# Patient Record
Sex: MALE | Race: WHITE | NOT HISPANIC OR LATINO | Employment: FULL TIME | RURAL
[De-identification: names, ages, dates, MRNs, and addresses within clinical notes are randomized per-mention and may not be internally consistent; named-entity substitution may affect disease eponyms.]

---

## 2022-09-19 ENCOUNTER — OFFICE VISIT (OUTPATIENT)
Dept: FAMILY MEDICINE CLINIC | Facility: CLINIC | Age: 27
End: 2022-09-19

## 2022-09-19 VITALS
OXYGEN SATURATION: 97 % | RESPIRATION RATE: 12 BRPM | SYSTOLIC BLOOD PRESSURE: 118 MMHG | DIASTOLIC BLOOD PRESSURE: 78 MMHG | HEART RATE: 93 BPM | BODY MASS INDEX: 28.52 KG/M2 | WEIGHT: 199.2 LBS | HEIGHT: 70 IN | TEMPERATURE: 98.4 F

## 2022-09-19 DIAGNOSIS — L24.7 CONTACT DERMATITIS AND ECZEMA DUE TO PLANT: Primary | ICD-10-CM

## 2022-09-19 DIAGNOSIS — R21 RASH AND OTHER NONSPECIFIC SKIN ERUPTION: ICD-10-CM

## 2022-09-19 PROCEDURE — 99213 OFFICE O/P EST LOW 20 MIN: CPT | Performed by: INTERNAL MEDICINE

## 2022-09-19 RX ORDER — TRIAMCINOLONE ACETONIDE 1 MG/G
1 CREAM TOPICAL 2 TIMES DAILY
Qty: 60 G | Refills: 1 | Status: SHIPPED | OUTPATIENT
Start: 2022-09-19

## 2022-09-19 RX ORDER — PREDNISONE 20 MG/1
20 TABLET ORAL DAILY
Qty: 12 TABLET | Refills: 0 | Status: SHIPPED | OUTPATIENT
Start: 2022-09-19

## 2022-09-19 NOTE — PROGRESS NOTES
"    Follow Up Office Visit      Date: 2022   Patient Name: Merlin Singh  : 1995   MRN: [id number]     Chief Complaint:    Chief Complaint   Patient presents with   • Rash       History of Present Illness: Merlin Singh is a 27 y.o. male who is here today for onset 5 days ago of an itchy rash that is progressive on his left greater than right arm and now in a couple areas of his left chest and left cheek, suspecting he was around poison ivy or poison oak the day prior working on an air conditioner.  He has been trying Benadryl cream and calamine along with oral Benadryl with minimal benefit.  No other acute problems at this time..    Subjective      Review of Systems:   Review of Systems    I have reviewed the patients family history, social history, past medical history, past surgical history and have updated it as appropriate.     Medications:     Current Outpatient Medications:   •  predniSONE (DELTASONE) 20 MG tablet, Take 1 tablet by mouth Daily., Disp: 12 tablet, Rfl: 0  •  triamcinolone (KENALOG) 0.1 % cream, Apply 1 application topically to the appropriate area as directed 2 (Two) Times a Day. As needed for rash, Disp: 60 g, Rfl: 1    Allergies:   No Known Allergies    Objective     Physical Exam: Please see above  Vital Signs:   Vitals:    22 1320   BP: 118/78   BP Location: Right arm   Patient Position: Sitting   Cuff Size: Adult   Pulse: 93   Resp: 12   Temp: 98.4 °F (36.9 °C)   TempSrc: Temporal   SpO2: 97%   Weight: 90.4 kg (199 lb 3.2 oz)   Height: 176.5 cm (69.5\")     Body mass index is 28.99 kg/m².       Physical Exam  General: Very pleasant husky healthy-appearing 27-year-old white male.  Not ill at all.  Lungs clear  Cardiac regular rate rhythm with no murmurs gallops rubs  Cutaneous exam reveals stents of erythematous patches that are raised and confluent over his left brow and lower arm, consistent with a contact dermatitis, smaller lesions on the right upper and lower arm arm a " few erythematous excoriated lesions on the left trunk and a faint lesion on the left cheek, all consistent with a contact dermatitis.  Procedures    Results:   Labs:   No results found for: HGBA1C, CMP, CBCDIFFPANEL, CREAT, TSH     POCT Results (if applicable):   No results found for this or any previous visit.    Imaging:   No valid procedures specified.       Assessment / Plan      Assessment/Plan:   Diagnoses and all orders for this visit:    1. Contact dermatitis and eczema due to plant (Primary)  -     predniSONE (DELTASONE) 20 MG tablet; Take 1 tablet by mouth Daily.  Dispense: 12 tablet; Refill: 0  -     triamcinolone (KENALOG) 0.1 % cream; Apply 1 application topically to the appropriate area as directed 2 (Two) Times a Day. As needed for rash  Dispense: 60 g; Refill: 1  Treat as above, discussed avoidance in the future wearing longsleeve close to avoid contact to the skin.  Advise if not improving over the next 1 to 2 weeks.  2. Rash and other nonspecific skin eruption  See above.    Strongly advised patient obtain flu vaccine and also COVID-19 vaccine series once he is off the prednisone for 7 to 10 days.      Follow Up:   Return if symptoms worsen or fail to improve.      At [facility], we believe that sharing information builds trust and better relationships. You are receiving this note because you recently visited [facility]. It is possible you will see health information before a provider has talked with you about it. This kind of information can be easy to misunderstand. To help you fully understand what it means for your health, we urge you to discuss this note with your provider.    Kodak Haque MD  [facility]